# Patient Record
Sex: FEMALE | Race: WHITE
[De-identification: names, ages, dates, MRNs, and addresses within clinical notes are randomized per-mention and may not be internally consistent; named-entity substitution may affect disease eponyms.]

---

## 2017-05-02 ENCOUNTER — HOSPITAL ENCOUNTER (OUTPATIENT)
Dept: HOSPITAL 45 - C.LAB1850 | Age: 52
Discharge: HOME | End: 2017-05-02
Attending: INTERNAL MEDICINE
Payer: COMMERCIAL

## 2017-05-02 DIAGNOSIS — E55.9: ICD-10-CM

## 2017-05-02 DIAGNOSIS — N25.81: ICD-10-CM

## 2017-05-02 DIAGNOSIS — I12.9: ICD-10-CM

## 2017-05-02 DIAGNOSIS — N18.2: ICD-10-CM

## 2017-05-02 DIAGNOSIS — R80.9: Primary | ICD-10-CM

## 2017-05-02 LAB
ANION GAP SERPL CALC-SCNC: 10 MMOL/L (ref 3–11)
APPEARANCE UR: CLEAR
BILIRUB UR-MCNC: (no result) MG/DL
BUN SERPL-MCNC: 21 MG/DL (ref 7–18)
BUN/CREAT SERPL: 22.3 (ref 10–20)
CALCIUM SERPL-MCNC: 9.1 MG/DL (ref 8.5–10.1)
CHLORIDE SERPL-SCNC: 101 MMOL/L (ref 98–107)
CO2 SERPL-SCNC: 25 MMOL/L (ref 21–32)
COLOR UR: YELLOW
CREAT SERPL-MCNC: 0.96 MG/DL (ref 0.6–1.2)
CREAT UR-MCNC: 120 MG/DL
EOSINOPHIL NFR BLD AUTO: 257 K/UL (ref 130–400)
GLUCOSE SERPL-MCNC: 237 MG/DL (ref 70–99)
HCT VFR BLD CALC: 37.5 % (ref 37–47)
MANUAL MICROSCOPIC REQUIRED?: NO
MCH RBC QN AUTO: 25.1 PG (ref 25–34)
MCHC RBC AUTO-ENTMCNC: 32.8 G/DL (ref 32–36)
MCV RBC AUTO: 76.4 FL (ref 80–100)
NITRITE UR QL STRIP: (no result)
PH UR STRIP: 5 [PH] (ref 4.5–7.5)
PHOSPHATE SERPL-MCNC: 3.4 MG/DL (ref 2.5–4.9)
PMV BLD AUTO: 9.7 FL (ref 7.4–10.4)
POTASSIUM SERPL-SCNC: 3.4 MMOL/L (ref 3.5–5.1)
PROT UR STRIP-MCNC: 7.7 MG/DL (ref 0–11.9)
RBC # BLD AUTO: 4.91 M/UL (ref 4.2–5.4)
REVIEW REQ?: NO
SODIUM SERPL-SCNC: 136 MMOL/L (ref 136–145)
SP GR UR STRIP: 1.02 (ref 1–1.03)
URINE EPITHELIAL CELL AUTO: >30 /LPF (ref 0–5)
URINE PROTIEN/CREAT RATIO: 0.1 (ref 0–0.2)
UROBILINOGEN UR-MCNC: (no result) MG/DL
WBC # BLD AUTO: 9.76 K/UL (ref 4.8–10.8)

## 2017-08-25 ENCOUNTER — HOSPITAL ENCOUNTER (EMERGENCY)
Dept: HOSPITAL 45 - C.EDA | Age: 52
Discharge: HOME | End: 2017-08-25
Payer: COMMERCIAL

## 2017-08-25 VITALS
BODY MASS INDEX: 42.77 KG/M2 | HEIGHT: 67.99 IN | BODY MASS INDEX: 42.77 KG/M2 | WEIGHT: 282.19 LBS | HEIGHT: 67.99 IN | WEIGHT: 282.19 LBS

## 2017-08-25 VITALS — SYSTOLIC BLOOD PRESSURE: 123 MMHG | HEART RATE: 86 BPM | DIASTOLIC BLOOD PRESSURE: 74 MMHG | OXYGEN SATURATION: 96 %

## 2017-08-25 VITALS — OXYGEN SATURATION: 97 % | HEART RATE: 112 BPM

## 2017-08-25 VITALS — OXYGEN SATURATION: 93 %

## 2017-08-25 VITALS — TEMPERATURE: 98.06 F
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
## 2017-08-25 DIAGNOSIS — E78.00: ICD-10-CM

## 2017-08-25 DIAGNOSIS — Z79.4: ICD-10-CM

## 2017-08-25 DIAGNOSIS — T78.3XXA: ICD-10-CM

## 2017-08-25 DIAGNOSIS — Z79.82: ICD-10-CM

## 2017-08-25 DIAGNOSIS — E11.9: ICD-10-CM

## 2017-08-25 DIAGNOSIS — Z82.49: ICD-10-CM

## 2017-08-25 DIAGNOSIS — I10: ICD-10-CM

## 2017-08-25 DIAGNOSIS — R22.0: Primary | ICD-10-CM

## 2017-08-25 NOTE — EMERGENCY ROOM VISIT NOTE
History


Report prepared by Mari:  Carol Dewey


Under the Supervision of:  Dr. Nicholas Jewell M.D.


First contact with patient:  08:30


Stated Complaint:  ALLERGIC REACTION





History of Present Illness


The patient is a 52 year old female who presents to the Emergency Room with 

complaints of a sudden allergic reaction that began prior to arrival.  The 

patient states that before leaving for work this morning she took an Aleve, 

along with her other daily morning medications.  She states that she began 

feeling numb, but additionally notes that she has a history of neuropathy.  The 

patient states that she began noticing tongue swelling, lip swelling, and 

difficulty breathing.  She denies any itchiness or rash.  The patient denies 

any bee sting or insect bite.  She reports a history of diabetes, hypertension 

and high cholesterol.  The patient reports taking Lisinopril for her 

hypertension, noting that she took it this morning prior to leaving for work.  

She denies any personal history of heart disease, but reports a family history 

of heart disease.  Per EMS, the patient was given 80 of Solu-Medrol and 50 mg 

of Benadryl prior to arrival.





   Source of History:  patient, EMS


   Onset:  prior to arrival


   Position:  other (global)


   Quality:  other (allergic reaction)


   Timing:  other (sudden)


   Associated Symptoms:  + numbness, No rash


Note:


Associated Symptoms: tongue swelling, lip swelling, difficulty breathing





Review of Systems


See HPI for pertinent positives & negatives. A total of 10 systems reviewed and 

were otherwise negative.





Past Medical & Surgical


Medical Problems:


(1) Diabetes


(2) High cholesterol


(3) Hypertension


(4) Neuropathy








Family History





FH: heart disease





Social History


Marital Status:  in relationship


Occupation Status:  employed





Current/Historical Medications


Scheduled


Amlodipine (Norvasc), Unknown Dose PO DAILY


Aspirin (Aspirin Ec), 81 MG PO DAILY


Atorvastatin (Lipitor), Unknown Dose PO DAILY


Cholecalciferol (Vitamin D3), Unknown Dose PO DAILY


Diphenhydramine Hcl (Benadryl Allergy), 50 MG PO Q6


Epinephrine (Epipen 2-Chris), 1 DOSE IM AS DIRECTED


Hydrochlorothiazide (Hydrochlorothiazide), Unknown Dose PO DAILY


Insulin Aspart (Novolog), 20 UNITS SQ TIDM


Insulin Detemir (Levemir), 95 UNITS SQ HS


Levothyroxine Sodium (Synthroid), 175 MCG PO DAILY


Lisinopril (Lisinopril), 40 MG PO DAILY


Metformin Hcl (Glucophage), 1,000 MG PO BID





Miscellaneous Medications


Krill Oil (Krill Oil 500 mg)





Allergies


Coded Allergies:  


     No Known Allergies (Verified , 8/25/17)





Physical Exam


Vital Signs











  Date Time  Temp Pulse Resp B/P (MAP) Pulse Ox O2 Delivery O2 Flow Rate FiO2


 


8/25/17 12:32  86 18 123/74 96 Room Air  


 


8/25/17 10:33  97 20 97/49 93 Room Air  


 


8/25/17 09:11  115 22 136/94 95 Room Air  


 


8/25/17 08:52  112 18  97 Room Air  


 


8/25/17 08:42  113      


 


8/25/17 08:36     94 Room Air  


 


8/25/17 08:36 36.7 114 20 139/93 94 Room Air  


 


8/25/17 08:35     93 Room Air  











Physical Exam


GENERAL: Patient is in no acute distress.


HEENT: No acute trauma, normocephalic atraumatic, mild lip edema, no uvular 

edema, mucous membranes moist, no nasal congestion, no scleral icterus.


NECK: No stridor, no adenopathy, no meningismus, trachea is midline.


LUNGS: Clear to auscultation bilaterally, no wheeze, no rhonchi, breath sounds 

equal.


HEART: Mildly tachycardic with a regular rhythm, no murmurs.


ABDOMEN: Soft, nontender, bowel sounds positive, no hernias, no peritonitis.


EXTREMITIES: No cyanosis or edema, full range of motion of all the joints 

without pain or difficulty, no signs for acute trauma.


NEUROLOGIC: Oriented x 3, no acute motor or sensory deficits, no focal weakness.


SKIN: No rash, no jaundice, no diaphoresis. No hives.





Medical Decision & Procedures


Laboratory Results











Test


  8/25/17


08:43


 


Bedside Glucose


  347 mg/dl


(70-90)








Laboratory results reviewed by me.





Medications Administered











 Medications


  (Trade)  Dose


 Ordered  Sig/Jose


 Route  Start Time


 Stop Time Status Last Admin


Dose Admin


 


 Epinephrine HCl


  (EpINEphrine INJ


 1MG/ML AMP/VIAL)  0.3 mg  NOW  STAT


 IM  8/25/17 08:36


 8/25/17 08:39 DC 8/25/17 08:46


0.3 MG


 


 Levalbuterol


  (Xopenex 1.25MG/


 0.5ML Neb)  1.25 mg  NOW  STAT


 INH  8/25/17 08:36


 8/25/17 08:39 DC 8/25/17 08:52


1.25 MG


 


 Ipratropium


 Bromide


  (Atrovent 0.02%


 0.5MG/2.5ML Neb)  0.5 mg  NOW  STAT


 INH  8/25/17 08:36


 8/25/17 08:39 DC 8/25/17 08:50


0.5 MG


 


 Insulin Human


 Regular


  (novoLIN-R U-100


 PER UNIT)  25 units  NOW  STAT


 SQ  8/25/17 10:31


 8/25/17 10:32 DC 8/25/17 11:32


25 UNITS











ECG


Indication:  other (allergic reaction)


Rate (beats per minute):  109


Rhythm:  sinus tachycardia


Findings:  no acute ischemic change, no ectopy





ED Course


0832: The patient was evaluated in room A10. A complete history and physical 

exam was performed.





0836: Ordered Ipratropium Bromide 0.5 mg INH, Levalbuterol 1.25 mg INH, 

Epinephrine HCl 0.3 mg IM.





0858: I reevaluated the patient and she is resting comfortably.





0936: I reevaluated the patient and she is doing well.





1031: Ordered Insulin Human Regular 25 units SQ.





1233: I reevaluated the patient and she is back to baseline.  I discussed the 

exam findings with her and I discussed the treatment plan.  She verbalized 

complete understanding and agreement.  She is ready to go home.





Medical Decision


The patient is a 52 year old female who presents to the ED with complaints of 

an allergic reaction.  Differential diagnoses considered include Angioedema 

secondary to medication, allergic reaction, anaphylaxis, uvular edema, wheezing

, environmental or food allergy.





The patient presents with some lip and tongue swelling.  She felt slightly 

short of breath.  This started after her morning medications, this medication 

regimen included an ACE inhibitor.  She also states that today, she took an 

Aleve around the same time.





The patient received Solu-Medrol and Benadryl prior to arrival.  Here, she was 

a dose of IM epinephrine.  She received a Xopenex Atrovent neb.





The patient has been here for around 4 hours.  Her symptoms have resolved, she 

looks well and feels at baseline.





The patient received a dose of subcutaneous insulin for a higher blood sugar.  

She states this is the normal amount of insulin she would take for a value in 

the 300s.





I'm not sure if this presentation was angioedema from her ACE inhibitor or 

possibly, an allergic reaction from the Aleve.  She will stop both.  She does 

not want any steroids to use as this will affect her sugar too greatly.  She 

will be on Benadryl and I am prescribing an EpiPen.  She will follow with her 

doctors office and return here for worsening symptoms.





Impression





 Primary Impression:  


 Lip swelling


 Additional Impression:  


 Angioedema





Scribe Attestation


The scribe's documentation has been prepared under my direction and personally 

reviewed by me in its entirety. I confirm that the note above accurately 

reflects all work, treatment, procedures, and medical decision making performed 

by me.





Departure Information


Dispostion


Home / Self-Care





Prescriptions





Epinephrine (EPIPEN 2-CHRIS) 0.3 Mg Inj


1 DOSE IM AS DIRECTED, #1 APPL 2 Refills


   Prov: Nicholas Jewell M.D.         8/25/17 


Diphenhydramine Hcl (BENADRYL ALLERGY) 25 Mg Tab


50 MG PO Q6 for 3 Days, #24 TAB


   Prov: Nicholas Jewell M.D.         8/25/17





Referrals


Juaquin Tang M.D. (PCP)





Forms


HOME CARE DOCUMENTATION FORM,                                                 

               IMPORTANT VISIT INFORMATION





Patient Instructions


My [facility]





Additional Instructions





hold lisinopril


hold aspirin and any aleve, motrin, advil or ibuprofen


see your doctor in follow up as soon as you are able





benadryl 2 tab every 6 hours for 3 days


epipen with you at all times and use it if worsening


return to the ER if worsening





we are not sure if this was the aleve or the lisinopril





Problem Qualifiers

## 2017-10-25 ENCOUNTER — HOSPITAL ENCOUNTER (OUTPATIENT)
Dept: HOSPITAL 45 - C.LAB1850 | Age: 52
Discharge: HOME | End: 2017-10-25
Attending: INTERNAL MEDICINE
Payer: COMMERCIAL

## 2017-10-25 DIAGNOSIS — I12.9: Primary | ICD-10-CM

## 2017-10-25 DIAGNOSIS — R80.9: ICD-10-CM

## 2017-10-25 DIAGNOSIS — N25.81: ICD-10-CM

## 2017-10-25 DIAGNOSIS — E55.9: ICD-10-CM

## 2017-10-25 DIAGNOSIS — N18.2: ICD-10-CM

## 2017-10-25 LAB
ANION GAP SERPL CALC-SCNC: 9 MMOL/L (ref 3–11)
APPEARANCE UR: CLEAR
BILIRUB UR-MCNC: (no result) MG/DL
BUN SERPL-MCNC: 14 MG/DL (ref 7–18)
BUN/CREAT SERPL: 15.8 (ref 10–20)
CALCIUM SERPL-MCNC: 9.6 MG/DL (ref 8.5–10.1)
CHLORIDE SERPL-SCNC: 101 MMOL/L (ref 98–107)
CO2 SERPL-SCNC: 27 MMOL/L (ref 21–32)
COLOR UR: YELLOW
CREAT SERPL-MCNC: 0.86 MG/DL (ref 0.6–1.2)
CREAT UR-MCNC: 90.6 MG/DL
EOSINOPHIL NFR BLD AUTO: 305 K/UL (ref 130–400)
GLUCOSE SERPL-MCNC: 158 MG/DL (ref 70–99)
HCT VFR BLD CALC: 40.6 % (ref 37–47)
MANUAL MICROSCOPIC REQUIRED?: NO
MCH RBC QN AUTO: 24.6 PG (ref 25–34)
MCHC RBC AUTO-ENTMCNC: 33 G/DL (ref 32–36)
MCV RBC AUTO: 74.5 FL (ref 80–100)
NITRITE UR QL STRIP: (no result)
PH UR STRIP: 7 [PH] (ref 4.5–7.5)
PHOSPHATE SERPL-MCNC: 4.3 MG/DL (ref 2.5–4.9)
PMV BLD AUTO: 9.4 FL (ref 7.4–10.4)
POTASSIUM SERPL-SCNC: 3.9 MMOL/L (ref 3.5–5.1)
PROT UR STRIP-MCNC: 6.8 MG/DL (ref 0–11.9)
RBC # BLD AUTO: 5.45 M/UL (ref 4.2–5.4)
REVIEW REQ?: NO
SODIUM SERPL-SCNC: 137 MMOL/L (ref 136–145)
SP GR UR STRIP: 1.01 (ref 1–1.03)
URINE EPITHELIAL CELL AUTO: >30 /LPF (ref 0–5)
URINE PROTIEN/CREAT RATIO: 0.1 (ref 0–0.2)
UROBILINOGEN UR-MCNC: (no result) MG/DL
WBC # BLD AUTO: 10.21 K/UL (ref 4.8–10.8)

## 2018-03-22 ENCOUNTER — HOSPITAL ENCOUNTER (OUTPATIENT)
Dept: HOSPITAL 45 - C.MAMM | Age: 53
Discharge: HOME | End: 2018-03-22
Attending: FAMILY MEDICINE
Payer: COMMERCIAL

## 2018-03-22 DIAGNOSIS — Z12.31: Primary | ICD-10-CM

## 2018-03-22 NOTE — MAMMOGRAPHY REPORT
BILATERAL DIGITAL SCREENING MAMMOGRAM TOMOSYNTHESIS WITH CAD: 3/22/2018

CLINICAL HISTORY: Routine screening.  Patient has no complaints.  





TECHNIQUE:  Breast tomosynthesis in addition to standard 2D mammography was performed. Current study 
was also evaluated with a Computer Aided Detection (CAD) system.  



COMPARISON: Comparison is made to exams dated:  1/6/2014 mammogram, 1/13/2015 mammogram, 1/3/2013 [facility], and 9/14/2007.   



BREAST COMPOSITION:  The tissue of both breasts is almost entirely fatty.  



FINDINGS:  No suspicious masses, calcifications, or areas of architectural distortion are noted in ei
ther breast. There has been no significant interval change compared to prior exams. Scattered bilater
al benign-appearing calcifications are not significantly changed.  





IMPRESSION:  ACR BI-RADS CATEGORY 2: BENIGN

There is no mammographic evidence of malignancy. A 1 year screening mammogram is recommended.  The pa
tient will receive written notification of the results.  





Approximately 10% of breast cancers are not detected with mammography. A negative mammographic report
 should not delay biopsy if a clinically suggestive mass is present.



Sabrina Cano M.D.          

/:3/22/2018 14:07:36  



Imaging Technologist: Carina SANTIAGO(LUKE)(M), [facility]

letter sent: Normal 1/2  

BI-RADS Code: ACR BI-RADS Category 2: Benign

## 2018-05-16 ENCOUNTER — HOSPITAL ENCOUNTER (OUTPATIENT)
Dept: HOSPITAL 45 - C.LAB1850 | Age: 53
Discharge: HOME | End: 2018-05-16
Attending: INTERNAL MEDICINE
Payer: COMMERCIAL

## 2018-05-16 DIAGNOSIS — R80.9: ICD-10-CM

## 2018-05-16 DIAGNOSIS — N18.9: ICD-10-CM

## 2018-05-16 DIAGNOSIS — E55.9: ICD-10-CM

## 2018-05-16 DIAGNOSIS — N25.81: ICD-10-CM

## 2018-05-16 DIAGNOSIS — I12.9: Primary | ICD-10-CM

## 2018-05-16 LAB
ALBUMIN SERPL-MCNC: 3.3 GM/DL (ref 3.4–5)
BUN SERPL-MCNC: 14 MG/DL (ref 7–18)
CALCIUM SERPL-MCNC: 8.7 MG/DL (ref 8.5–10.1)
CO2 SERPL-SCNC: 25 MMOL/L (ref 21–32)
CREAT SERPL-MCNC: 0.92 MG/DL (ref 0.6–1.2)
EOSINOPHIL NFR BLD AUTO: 265 K/UL (ref 130–400)
GLUCOSE SERPL-MCNC: 158 MG/DL (ref 70–99)
HCT VFR BLD CALC: 37.2 % (ref 37–47)
HGB BLD-MCNC: 12.2 G/DL (ref 12–16)
MCH RBC QN AUTO: 24.3 PG (ref 25–34)
MCHC RBC AUTO-ENTMCNC: 32.8 G/DL (ref 32–36)
MCV RBC AUTO: 74 FL (ref 80–100)
PHOSPHATE SERPL-MCNC: 3.9 MG/DL (ref 2.5–4.9)
PMV BLD AUTO: 9.6 FL (ref 7.4–10.4)
POTASSIUM SERPL-SCNC: 4 MMOL/L (ref 3.5–5.1)
RED CELL DISTRIBUTION WIDTH CV: 14.9 % (ref 11.5–14.5)
RED CELL DISTRIBUTION WIDTH SD: 39.7 FL (ref 36.4–46.3)
SODIUM SERPL-SCNC: 140 MMOL/L (ref 136–145)
WBC # BLD AUTO: 6.24 K/UL (ref 4.8–10.8)